# Patient Record
(demographics unavailable — no encounter records)

---

## 2025-01-17 NOTE — REASON FOR VISIT
[Initial Evaluation] : an initial evaluation [Mother] : mother [FreeTextEntry1] : soft tissue masses of both feet.

## 2025-01-17 NOTE — HISTORY OF PRESENT ILLNESS
[FreeTextEntry1] : Santos is a healthy 6-month-old boy who was born  delivery due to previous .  He presents today with chief complaint of bilateral soft tissue masses of his feet.  He underwent a bilateral foot ultrasound confirming fibrofatty tissue of both feet.  No cysts noted.  He is currently meeting his milestones.  He presents today for pediatric orthopedic consultation.

## 2025-01-17 NOTE — DATA REVIEWED
[de-identified] : [facility] bilateral foot ultrasounds confirming fibrofatty tissue ([facility]).  No cyst noted.

## 2025-01-17 NOTE — PHYSICAL EXAM
[FreeTextEntry1] : Pleasant and cooperative with exam, appropriate for age. Awake and alert appropriate for their age. The patient has the appropriate coordination and balance noted on the table today for their age.   Bilateral hips: Full active and passive range of motion of both hips. There is no asymmetrical thigh folds noted. No abnormal birth coffey noted. Negative Ortolani, negative Wilks. There is no palpable click or clunk noted. Negative Galeazzi. No leg length discrepancy noted. Muscle strength 5bilaterally. Both hip joints are stable with stress maneuvers.   Bilateral feet: Full active and passive range of motion with no signs of metatarsus adductus or clubfoot deformity.  Achilles dorsiflexion bilaterally 25 degrees.  Positive soft painless soft tissue mass is noted over the medial posterior aspect of the feet.  Neurologically intact with full sensation to palpation.

## 2025-01-17 NOTE — ASSESSMENT
[FreeTextEntry1] : Santos is a 6-month-old boy who has bilateral fibrofatty tissue masses of both feet. Today's assessment was performed with the assistance of the patient's parent as an independent historian as the patient's history is unreliable. [facility] US fibrofatty tissue.  At this time no surgical intervention is needed.  The soft tissue masses that should resolve and decrease in size as he develops.  There is no further orthopedic intervention warranted at this time unless these appear to be getting larger.  We had a thorough talk in regard to the diagnosis, prognosis and treatment modalities.  All questions and concerns were addressed today. There was a verbal understanding from the parents and patient.  MARTHA Cannon have acted as a scribe and documented the above information for Dr. Barcenas.  This note was generated using Dragon medical dictation software. A reasonable effort has been made for proofreading its contents, however typos may still remain. If there are any questions or points of clarification needed please do not hesitate to contact my office.   The above documentation completed by the scribe is an accurate record of both my words and actions.  JPD